# Patient Record
Sex: FEMALE | Race: WHITE | Employment: UNEMPLOYED | URBAN - METROPOLITAN AREA
[De-identification: names, ages, dates, MRNs, and addresses within clinical notes are randomized per-mention and may not be internally consistent; named-entity substitution may affect disease eponyms.]

---

## 2017-01-17 ENCOUNTER — APPOINTMENT (OUTPATIENT)
Dept: GENERAL RADIOLOGY | Age: 37
End: 2017-01-17
Attending: EMERGENCY MEDICINE
Payer: MEDICARE

## 2017-01-17 ENCOUNTER — HOSPITAL ENCOUNTER (EMERGENCY)
Age: 37
Discharge: HOME | End: 2017-01-17
Attending: EMERGENCY MEDICINE
Payer: MEDICARE

## 2017-01-17 VITALS
HEIGHT: 62 IN | DIASTOLIC BLOOD PRESSURE: 87 MMHG | BODY MASS INDEX: 29.44 KG/M2 | TEMPERATURE: 98.6 F | HEART RATE: 99 BPM | OXYGEN SATURATION: 96 % | SYSTOLIC BLOOD PRESSURE: 145 MMHG | RESPIRATION RATE: 18 BRPM | WEIGHT: 160 LBS

## 2017-01-17 DIAGNOSIS — M25.511 ACUTE PAIN OF RIGHT SHOULDER: Primary | ICD-10-CM

## 2017-01-17 PROCEDURE — 99283 EMERGENCY DEPT VISIT LOW MDM: CPT | Performed by: EMERGENCY MEDICINE

## 2017-01-17 PROCEDURE — 73030 X-RAY EXAM OF SHOULDER: CPT

## 2017-01-17 PROCEDURE — 74011250637 HC RX REV CODE- 250/637: Performed by: EMERGENCY MEDICINE

## 2017-01-17 RX ORDER — OXYCODONE HYDROCHLORIDE 5 MG/1
5 TABLET ORAL
Status: COMPLETED | OUTPATIENT
Start: 2017-01-17 | End: 2017-01-17

## 2017-01-17 RX ORDER — OXYCODONE HYDROCHLORIDE 5 MG/1
5 TABLET ORAL
Qty: 17 TAB | Refills: 0 | Status: SHIPPED | OUTPATIENT
Start: 2017-01-17 | End: 2017-01-29

## 2017-01-17 RX ADMIN — OXYCODONE HYDROCHLORIDE 5 MG: 5 TABLET ORAL at 23:15

## 2017-01-18 NOTE — DISCHARGE INSTRUCTIONS
Rest  Ice shoulder -- big bag of ice, 20-30 minutes 3-4 times a day for 5 days  Motrin 400mg every 6 hours for 5 days    Call Elyse Boeck in the morning [address] [place] [address]         Rotator Cuff Injury: Care Instructions  Your Care Instructions  The rotator cuff is a group of tendons and muscles around the shoulder that keeps the upper arm bone in place. It keeps the shoulder joint stable and allows you to raise and rotate your arm. Damage to the rotator cuff can be caused by overuse, a fall, or a direct blow to the shoulder area, which can tear the tendons. Over time, everyday wear can damage the tendons and make injury more likely. Treatment can depend upon the amount of damage to the tendons. In a younger person, surgery may be the first choice. But if you are older than 25, you likely have some wear on your rotator cuff. Surgery may not be the most effective treatment. Your doctor may have you try physical therapy and exercise first.  Follow-up care is a key part of your treatment and safety. Be sure to make and go to all appointments, and call your doctor if you are having problems. It's also a good idea to know your test results and keep a list of the medicines you take. How can you care for yourself at home? · Rest your shoulder as much as you can. If your doctor put your arm in a sling or shoulder immobilizer, wear it as directed. Do not take it off before your doctor tells you to. If it is too tight, loosen it. · Be safe with medicines. Read and follow all instructions on the label. ¨ If the doctor gave you a prescription medicine for pain, take it as prescribed. ¨ If you are not taking a prescription pain medicine, ask your doctor if you can take an over-the-counter medicine. · Put ice or a cold pack on your shoulder for 10 to 20 minutes at a time. Try to do this every 1 to 2 hours for the next 3 days (when you are awake). Put a thin cloth between the ice pack and your skin.   · After 3 days, put a warm, wet towel on your shoulder. This is to relax the muscles and help blood flow. · While holding a warm, wet towel on your shoulder, lean forward so your arm hangs freely, and gently swing your arm back and forth like a pendulum. You also can do this standing under a warm shower. · Do not do anything that makes your pain worse. · Follow your doctor's advice about whether you need physical therapy. When should you call for help? Call your doctor now or seek immediate medical care if:  · You have severe pain. · You cannot move your shoulder or arm. · You have tingling or numbness in your arm or hand. · Your arm or hand is cool or pale. Watch closely for changes in your health, and be sure to contact your doctor if:  · Your pain gets worse. · You have new or worse swelling in your arm or hand. · You do not get better as expected. Where can you learn more? Go to http://prachi-lorrie.info/. Enter [id number] [id number] in the search box to learn more about \"Rotator Cuff Injury: Care Instructions. \"  Current as of: May 23, 2016  Content Version: 11.1  © 6622-0568 [employer]. Care instructions adapted under license by [employer] (which disclaims liability or warranty for this information). If you have questions about a medical condition or this instruction, always ask your healthcare professional. [address] any warranty or liability for your use of this information. Xr Shoulder Rt Ap/lat Min 2 V    Result Date: 1/17/2017  Right Shoulder INDICATION: Fall. COMPARISON: September 23, 2015 TECHNIQUE: Three views of the right shoulder were obtained FINDINGS: There is no acute fracture or dislocation. There is mild glenohumeral joint space narrowing. The [facility] joint is intact. The right lung is clear. IMPRESSION: No acute fracture or dislocation.

## 2017-01-18 NOTE — ED NOTES
Pt verbalized understanding of discharge instructions and prescription, signed form and ambualted out with friend, in nad

## 2017-01-18 NOTE — ED PROVIDER NOTES
HPI Comments: 43-year-old female presents to the emergency department right shoulder pain. Patient states she had a seizure several days ago. Had increased pain in the shoulder since then. Patient has a history of rotator cuff injury and previous surgery. Having pain with movement. Difficulty reaching in the high cabinets. Difficulty washing her hair    No alleviating        Patient is a 39 y.o. female presenting with shoulder pain. Shoulder Pain           Past Medical History:   Diagnosis Date    Kidney stones     Neurological disorder      Frequent headaches    Other ill-defined conditions(799.89)      migraine    PIH (pregnancy induced hypertension)      pre-eclampsia    Seizures (HCC)        Past Surgical History:   Procedure Laterality Date    Hx other surgical  1986     plastic surgery--head    Pr breast surgery procedure unlisted  11/12/2009     right breast, another lumpectomy 1/19/2010    Hx gyn       csection/ hysterectomy 01/2011    Hx orthopaedic       R shoulder         No family history on file. Social History     Social History    Marital status: SINGLE     Spouse name: N/A    Number of children: N/A    Years of education: N/A     Occupational History    Not on file. Social History Main Topics    Smoking status: Current Every Day Smoker     Packs/day: 1.00    Smokeless tobacco: Never Used    Alcohol use No      Comment: rare    Drug use: No    Sexual activity: Yes     Partners: Male     Birth control/ protection: None     Other Topics Concern    Not on file     Social History Narrative         ALLERGIES: Promethazine; Propoxyphene n-acetaminophen; Toradol [ketorolac tromethamine]; and Ultram [tramadol]    Review of Systems   Constitutional: Negative for chills and fever. Respiratory: Negative for chest tightness and shortness of breath. Cardiovascular: Negative for chest pain. Musculoskeletal: Negative for back pain and neck pain.        Vitals:    01/17/17 2049 01/17/17 2321   BP: (!) 150/102 145/87   Pulse: (!) 105 99   Resp: 28 18   Temp: 98.6 °F (37 °C) 98.6 °F (37 °C)   SpO2: 100% 96%   Weight: 72.6 kg (160 lb)    Height: 5' 2\" (1.575 m)             Physical Exam   Constitutional: She is oriented to person, place, and time. She appears well-developed and well-nourished. No distress. Cardiovascular: Normal rate and regular rhythm. Pulmonary/Chest: Breath sounds normal. No respiratory distress. Musculoskeletal:        Right shoulder: She exhibits decreased range of motion. She exhibits no tenderness, no bony tenderness and no swelling. Arms:  Neurological: She is alert and oriented to person, place, and time. Skin: Skin is warm and dry. Nursing note and vitals reviewed. MDM  Number of Diagnoses or Management Options  Acute pain of right shoulder: new and requires workup  Diagnosis management comments: 43-year-old female with history of shoulder pain presents with worsening pain for the last several days. She had a previous surgery on that shoulder. Having pain reaching high cabinets washing her hair. Increased pain when she sleeps on that shoulder    X-ray does not reveal any bony injury    Recommend ice rest and gentle stretching.   Follow up with her orthopedist.  We'll discharge home           Amount and/or Complexity of Data Reviewed  Tests in the radiology section of CPT®: reviewed and ordered    Risk of Complications, Morbidity, and/or Mortality  Presenting problems: moderate  Diagnostic procedures: minimal  Management options: moderate      ED Course       Procedures

## 2017-01-18 NOTE — ED NOTES
Unable to locate patient for triage.  Visitors in waiting room state they think she went to the bathroom

## 2017-01-18 NOTE — ED TRIAGE NOTES
Patient to the ER via POV escorted by her friend. Patient states she had a seizure last Wednesday at work. Patient has a history of seizures and a torn rotator cuff to the right shoulder. Patient states she has attempted resting the shoulder for the last three days with no relief. Patient had her anti seizure levels checked and medications adjusted.

## 2017-01-29 ENCOUNTER — HOSPITAL ENCOUNTER (EMERGENCY)
Age: 37
Discharge: HOME | End: 2017-01-29
Attending: EMERGENCY MEDICINE
Payer: MEDICARE

## 2017-01-29 VITALS
DIASTOLIC BLOOD PRESSURE: 110 MMHG | SYSTOLIC BLOOD PRESSURE: 145 MMHG | OXYGEN SATURATION: 99 % | RESPIRATION RATE: 16 BRPM | WEIGHT: 180 LBS | TEMPERATURE: 97.8 F | HEART RATE: 108 BPM | BODY MASS INDEX: 32.92 KG/M2

## 2017-01-29 DIAGNOSIS — F11.93 OPIATE WITHDRAWAL (HCC): Primary | ICD-10-CM

## 2017-01-29 PROCEDURE — 74011250637 HC RX REV CODE- 250/637: Performed by: NURSE PRACTITIONER

## 2017-01-29 PROCEDURE — 99283 EMERGENCY DEPT VISIT LOW MDM: CPT | Performed by: NURSE PRACTITIONER

## 2017-01-29 RX ORDER — CLONIDINE HYDROCHLORIDE 0.1 MG/1
0.1 TABLET ORAL 3 TIMES DAILY
Qty: 9 TAB | Refills: 0 | Status: SHIPPED | OUTPATIENT
Start: 2017-01-29 | End: 2017-04-04

## 2017-01-29 RX ORDER — CLONIDINE HYDROCHLORIDE 0.1 MG/1
0.1 TABLET ORAL
Status: COMPLETED | OUTPATIENT
Start: 2017-01-29 | End: 2017-01-29

## 2017-01-29 RX ADMIN — CLONIDINE HYDROCHLORIDE 0.1 MG: 0.1 TABLET ORAL at 13:53

## 2017-01-29 NOTE — ED PROVIDER NOTES
HPI Comments: 38 y/o f to ed complaints w decreased sleep, sweats, elevated bp and malaise after quitting her methadone 4 days ago. Cant afford methadone clinic anymore. Now having pain all over but worse in her right shoulder (old injury). Appears uncomfortable and would like some valium as this has helped her sx in past.  No vomiting or diarrhea, no chest pain or palpitations but thinks her heart beat is faster than usual.  No sob, cough or congestion. hsx hysterectomy    Patient is a 39 y.o. female presenting with medication problem. The history is provided by the patient. No  was used. Medication Problem    This is a new problem. The current episode started more than 2 days ago. The problem has been gradually worsening. Associated symptoms include nausea. Pertinent negatives include no unusual behavior, no slurred speech, no walking unsteadily, no vomiting, no depression, no confusion and no shortness of breath. Past Medical History:   Diagnosis Date    Kidney stones     Neurological disorder      Frequent headaches    Other ill-defined conditions(799.89)      migraine    PIH (pregnancy induced hypertension)      pre-eclampsia    Seizures ([facility] [address]        Past Surgical History:   Procedure Laterality Date    Hx other surgical  1986     plastic surgery--head    Pr breast surgery procedure unlisted  11/12/2009     right breast, another lumpectomy 1/19/2010    Hx gyn       csection/ hysterectomy 01/2011    Hx orthopaedic       R shoulder         History reviewed. No pertinent family history. Social History     Social History    Marital status: SINGLE     Spouse name: N/A    Number of children: N/A    Years of education: N/A     Occupational History    Not on file.      Social History Main Topics    Smoking status: Current Every Day Smoker     Packs/day: 1.00    Smokeless tobacco: Never Used    Alcohol use No      Comment: rare    Drug use: No    Sexual activity: Yes Partners: Male     Birth control/ protection: None     Other Topics Concern    Not on file     Social History Narrative         ALLERGIES: Promethazine; Propoxyphene n-acetaminophen; Toradol [ketorolac tromethamine]; and Ultram [tramadol]    Review of Systems   Constitutional: Positive for activity change, appetite change and diaphoresis. Negative for chills and fever. HENT: Negative for ear pain, facial swelling and nosebleeds. Eyes: Positive for redness. Negative for discharge. Respiratory: Negative for cough, chest tightness and shortness of breath. Cardiovascular: Negative for chest pain and palpitations. Gastrointestinal: Positive for nausea. Negative for abdominal pain, diarrhea and vomiting. Endocrine: Negative for cold intolerance and heat intolerance. Genitourinary: Negative for difficulty urinating, dysuria and vaginal bleeding. Musculoskeletal: Positive for myalgias. Negative for back pain, gait problem, joint swelling, neck pain and neck stiffness. Skin: Negative for rash and wound. Neurological: Positive for weakness and headaches. Negative for dizziness, tremors, seizures, syncope, facial asymmetry, speech difficulty, light-headedness and numbness. Psychiatric/Behavioral: Negative for confusion, decreased concentration and depression. Vitals:    01/29/17 1227   BP: (!) 145/110   Pulse: (!) 108   Resp: 16   Temp: 97.8 °F (36.6 °C)   SpO2: 99%   Weight: 81.6 kg (180 lb)            Physical Exam   Constitutional: She is oriented to person, place, and time. She appears well-developed and well-nourished. No distress. HENT:   Head: Normocephalic and atraumatic. Right Ear: External ear normal.   Left Ear: External ear normal.   Nose: Nose normal.   Eyes: Conjunctivae and EOM are normal. Pupils are equal, round, and reactive to light. Neck: Normal range of motion. Neck supple. Cardiovascular: Normal rate, regular rhythm and normal heart sounds.     Pulmonary/Chest: Effort normal and breath sounds normal. No respiratory distress. She has no wheezes. Abdominal: Soft. Bowel sounds are normal. She exhibits no distension. There is no tenderness. Musculoskeletal: Normal range of motion. She exhibits no edema or tenderness. Neurological: She is alert and oriented to person, place, and time. No cranial nerve deficit. Coordination normal.   Skin: Skin is warm and dry. No rash noted. Psychiatric: She has a normal mood and affect. Her behavior is normal. Judgment and thought content normal.   Nursing note and vitals reviewed. MDM  Number of Diagnoses or Management Options  Diagnosis management comments: 38 y/o f w opiate withdrawal.  Will provide rsx for clonidine for a few days. I have told her to follow with methadone clinic for further assist tomorrow. She would prefer some valium but I have offered her clonidine.        Amount and/or Complexity of Data Reviewed  Discuss the patient with other providers: yes (rufus)    Risk of Complications, Morbidity, and/or Mortality  Presenting problems: minimal  Diagnostic procedures: minimal  Management options: minimal    Patient Progress  Patient progress: stable    ED Course       Procedures

## 2017-01-29 NOTE — LETTER
[address] [facility] EMERGENCY DEPT 
[facility] [address] [place] [id number] 
[phone] Work/School Note Date: 1/29/2017 To Whom It May concern: 
 
Anaya Curran was seen and treated today in the emergency room by the following provider(s): 
Attending Provider: Juana Chino MD 
Nurse Practitioner: Ortiz Hughes NP. Anaya Curran may return to work on Friday. Sincerely, Ortiz Hughes NP

## 2017-01-29 NOTE — ED TRIAGE NOTES
Pt reports she has quit going to the methadone clinic Tuesday/wednesday of last week. Pt reports she was taking 80mg a day. Pt reports she is going through withdrawals.     Gauri Huertas RN

## 2017-03-31 PROCEDURE — 75810000275 HC EMERGENCY DEPT VISIT NO LEVEL OF CARE: Performed by: EMERGENCY MEDICINE

## 2017-04-01 ENCOUNTER — HOSPITAL ENCOUNTER (EMERGENCY)
Age: 37
Discharge: HOME | End: 2017-04-01
Attending: EMERGENCY MEDICINE
Payer: MEDICARE

## 2017-04-04 ENCOUNTER — HOSPITAL ENCOUNTER (EMERGENCY)
Age: 37
Discharge: HOME | End: 2017-04-04
Attending: STUDENT IN AN ORGANIZED HEALTH CARE EDUCATION/TRAINING PROGRAM
Payer: MEDICARE

## 2017-04-04 VITALS
TEMPERATURE: 98 F | WEIGHT: 175 LBS | SYSTOLIC BLOOD PRESSURE: 164 MMHG | OXYGEN SATURATION: 99 % | HEIGHT: 62 IN | HEART RATE: 88 BPM | BODY MASS INDEX: 32.2 KG/M2 | DIASTOLIC BLOOD PRESSURE: 88 MMHG | RESPIRATION RATE: 20 BRPM

## 2017-04-04 DIAGNOSIS — L02.91 ABSCESS: Primary | ICD-10-CM

## 2017-04-04 PROCEDURE — 99283 EMERGENCY DEPT VISIT LOW MDM: CPT | Performed by: STUDENT IN AN ORGANIZED HEALTH CARE EDUCATION/TRAINING PROGRAM

## 2017-04-04 RX ORDER — SULFAMETHOXAZOLE AND TRIMETHOPRIM 800; 160 MG/1; MG/1
1 TABLET ORAL 2 TIMES DAILY
Qty: 20 TAB | Refills: 0 | Status: SHIPPED | OUTPATIENT
Start: 2017-04-04 | End: 2017-04-14

## 2017-04-04 RX ORDER — IBUPROFEN 800 MG/1
800 TABLET ORAL
Qty: 20 TAB | Refills: 0 | Status: SHIPPED | OUTPATIENT
Start: 2017-04-04 | End: 2017-04-11

## 2017-04-04 NOTE — ED PROVIDER NOTES
HPI Comments: 38 yo female patient presents to emergency department with reports of vaginal swelling and pain. Patient states symptoms started several days prior to arrival.  She states the area opened and drained spontaneously partially 2 days ago. She reports continued pain at this time. Patient reports a history of similar symptoms in the past on the other side requiring drainage and packing. Patient denies any lesions elsewhere on her body. She denies any fever or chills. She denies any other associated symptoms including dysuria, hematuria or vaginal discharge. Patient states she is sexually active active with only 1 partner who has no symptoms at this time. Patient states her partner is currently incarcerated and has been sober for 6 months. She denies any vaginal discharge, bleeding or hematuria. Patient is a 39 y.o. female presenting with skin problem. The history is provided by the patient. No  was used. Skin Problem    This is a new problem. The current episode started more than 2 days ago. The problem has not changed since onset. There has been no fever. Affected Location: left superior labia majora  The pain is at a severity of 6/10. The pain is moderate. The pain has been constant since onset. Associated symptoms include pain and weeping. Pertinent negatives include no blisters, no itching and no hives. She has tried nothing for the symptoms. The treatment provided no relief.         Past Medical History:   Diagnosis Date    Kidney stones     Neurological disorder     Frequent headaches    Other ill-defined conditions     migraine    PIH (pregnancy induced hypertension)     pre-eclampsia    Seizures ([facility] [address]        Past Surgical History:   Procedure Laterality Date    BREAST SURGERY PROCEDURE UNLISTED  11/12/2009    right breast, another lumpectomy 1/19/2010    HX GYN      csection/ hysterectomy 01/2011    HX ORTHOPAEDIC      R shoulder    [address] plastic surgery--head         History reviewed. No pertinent family history. Social History     Social History    Marital status: SINGLE     Spouse name: N/A    Number of children: N/A    Years of education: N/A     Occupational History    Not on file. Social History Main Topics    Smoking status: Current Every Day Smoker     Packs/day: 1.00    Smokeless tobacco: Never Used    Alcohol use No      Comment: rare    Drug use: No    Sexual activity: Yes     Partners: Male     Birth control/ protection: None     Other Topics Concern    Not on file     Social History Narrative         ALLERGIES: Promethazine; Propoxyphene n-acetaminophen; Toradol [ketorolac tromethamine]; and Ultram [tramadol]    Review of Systems   Constitutional: Negative for chills, diaphoresis and fever. HENT: Negative for congestion, sneezing and sore throat. Eyes: Negative for visual disturbance. Respiratory: Negative for cough, chest tightness, shortness of breath and wheezing. Cardiovascular: Negative for chest pain and leg swelling. Gastrointestinal: Negative for abdominal pain, blood in stool, diarrhea, nausea and vomiting. Endocrine: Negative for polyuria. Genitourinary: Negative for difficulty urinating, dysuria, flank pain, hematuria and urgency. Musculoskeletal: Negative for back pain, myalgias, neck pain and neck stiffness. Skin: Positive for wound. Negative for color change, itching and rash. Neurological: Negative for dizziness, syncope, speech difficulty, weakness, light-headedness, numbness and headaches. Psychiatric/Behavioral: Negative for behavioral problems. All other systems reviewed and are negative. Vitals:    04/04/17 1838   BP: (!) 171/109   Pulse: (!) 103   Resp: 18   Temp: 98.1 °F (36.7 °C)   SpO2: 100%   Weight: 79.4 kg (175 lb)   Height: 5' 2\" (1.575 m)            Physical Exam   Constitutional: She is oriented to person, place, and time.  She appears well-developed and well-nourished. No distress. Alert and oriented to person, place and time. No acute distress. Speaks in clear, fluent sentences. HENT:   Head: Normocephalic and atraumatic. Right Ear: External ear normal.   Nose: Nose normal.   Eyes: Conjunctivae and EOM are normal. Pupils are equal, round, and reactive to light. Neck: Normal range of motion. Neck supple. No JVD present. No tracheal deviation present. Cardiovascular: Normal rate, regular rhythm, S1 normal, S2 normal, normal heart sounds and intact distal pulses. Exam reveals no gallop, no distant heart sounds and no friction rub. No murmur heard. Pulmonary/Chest: Effort normal and breath sounds normal. No accessory muscle usage or stridor. No tachypnea and no bradypnea. No respiratory distress. She has no decreased breath sounds. She has no wheezes. She has no rhonchi. She has no rales. She exhibits no tenderness. Abdominal: Soft. Normal appearance. She exhibits no distension and no mass. There is no hepatosplenomegaly, splenomegaly or hepatomegaly. There is no tenderness. There is no rigidity, no rebound, no guarding, no CVA tenderness, no tenderness at McBurney's point and negative Florian's sign. Genitourinary:       There is tenderness in the vagina. No erythema or bleeding in the vagina. No foreign body in the vagina. No signs of injury around the vagina. No vaginal discharge found. Genitourinary Comments: There is an ulceration noted to the superior aspect of the left labia majora. No palpable area of fluctuance, surrounding erythema or active drainage. Moderate pain on exam.  There is no swelling inside the external vaginal opening her labia minora. Musculoskeletal: Normal range of motion. She exhibits no edema, tenderness or deformity. Neurological: She is alert and oriented to person, place, and time. No cranial nerve deficit. Skin: Skin is warm and dry. No rash noted. She is not diaphoretic.    Psychiatric: She has a normal mood and affect. Her behavior is normal.   Nursing note and vitals reviewed. MDM  Number of Diagnoses or Management Options  Abscess: new and requires workup  Diagnosis management comments: Patient states that this area recently opened and drained at home. I see no evidence of fluctuation or abscess amenable to drainage at this time. Discussed option for a back therapy and close follow-up with the patient. Also relayed my concern that this may be a herpetic lesion and offered general herpes swab to rule this out. Patient states she does not wish to undergo the swab and prefers antibiotics and close follow-up. She denies recurrent outbreaks, states she's experienced similar symptoms only once prior and denies any symptoms in her sexual partners in the past.    Advised patient of the importance of the return to emergency department for wound check in 48 hours. In addition patient's been referred to OB/GYN for outpatient follow-up.     Risk of Complications, Morbidity, and/or Mortality  Presenting problems: moderate  Diagnostic procedures: low  Management options: moderate    Patient Progress  Patient progress: stable    ED Course       Procedures

## 2017-04-04 NOTE — DISCHARGE INSTRUCTIONS

## 2017-04-04 NOTE — Clinical Note
The medication prescribed as directed for her symptoms. Return in 48 hours for reevaluation and recheck of your wound. Preform hot soaks 4-5 times daily to encourage continued drainage. Return sooner with worsening symptoms, concerns or questions. Gaudencio loja follow-up with the specialist listed on her discharge paperwork as discussed.